# Patient Record
Sex: FEMALE | Race: WHITE | NOT HISPANIC OR LATINO | URBAN - METROPOLITAN AREA
[De-identification: names, ages, dates, MRNs, and addresses within clinical notes are randomized per-mention and may not be internally consistent; named-entity substitution may affect disease eponyms.]

---

## 2018-07-29 ENCOUNTER — EMERGENCY (EMERGENCY)
Facility: HOSPITAL | Age: 33
LOS: 1 days | Discharge: HOME | End: 2018-07-29
Attending: EMERGENCY MEDICINE | Admitting: EMERGENCY MEDICINE
Payer: MEDICAID

## 2018-07-29 VITALS
HEART RATE: 67 BPM | RESPIRATION RATE: 16 BRPM | DIASTOLIC BLOOD PRESSURE: 66 MMHG | OXYGEN SATURATION: 99 % | SYSTOLIC BLOOD PRESSURE: 100 MMHG

## 2018-07-29 VITALS
DIASTOLIC BLOOD PRESSURE: 68 MMHG | RESPIRATION RATE: 14 BRPM | SYSTOLIC BLOOD PRESSURE: 110 MMHG | TEMPERATURE: 98 F | HEART RATE: 55 BPM | OXYGEN SATURATION: 100 %

## 2018-07-29 DIAGNOSIS — F43.21 ADJUSTMENT DISORDER WITH DEPRESSED MOOD: ICD-10-CM

## 2018-07-29 DIAGNOSIS — F50.9 EATING DISORDER, UNSPECIFIED: ICD-10-CM

## 2018-07-29 LAB
ALBUMIN SERPL ELPH-MCNC: 4.3 G/DL — SIGNIFICANT CHANGE UP (ref 3.3–5)
ALP SERPL-CCNC: 67 U/L — SIGNIFICANT CHANGE UP (ref 40–120)
ALT FLD-CCNC: 10 U/L — SIGNIFICANT CHANGE UP (ref 4–33)
APAP SERPL-MCNC: < 15 UG/ML — LOW (ref 15–25)
AST SERPL-CCNC: 16 U/L — SIGNIFICANT CHANGE UP (ref 4–32)
BASOPHILS # BLD AUTO: 0.05 K/UL — SIGNIFICANT CHANGE UP (ref 0–0.2)
BASOPHILS NFR BLD AUTO: 0.6 % — SIGNIFICANT CHANGE UP (ref 0–2)
BILIRUB SERPL-MCNC: 0.3 MG/DL — SIGNIFICANT CHANGE UP (ref 0.2–1.2)
BUN SERPL-MCNC: 8 MG/DL — SIGNIFICANT CHANGE UP (ref 7–23)
CALCIUM SERPL-MCNC: 9.2 MG/DL — SIGNIFICANT CHANGE UP (ref 8.4–10.5)
CHLORIDE SERPL-SCNC: 105 MMOL/L — SIGNIFICANT CHANGE UP (ref 98–107)
CO2 SERPL-SCNC: 22 MMOL/L — SIGNIFICANT CHANGE UP (ref 22–31)
CREAT SERPL-MCNC: 0.84 MG/DL — SIGNIFICANT CHANGE UP (ref 0.5–1.3)
EOSINOPHIL # BLD AUTO: 0.27 K/UL — SIGNIFICANT CHANGE UP (ref 0–0.5)
EOSINOPHIL NFR BLD AUTO: 3.5 % — SIGNIFICANT CHANGE UP (ref 0–6)
ETHANOL BLD-MCNC: < 10 MG/DL — SIGNIFICANT CHANGE UP
GLUCOSE SERPL-MCNC: 80 MG/DL — SIGNIFICANT CHANGE UP (ref 70–99)
HCG SERPL-ACNC: < 5 MIU/ML — SIGNIFICANT CHANGE UP
HCT VFR BLD CALC: 35.9 % — SIGNIFICANT CHANGE UP (ref 34.5–45)
HGB BLD-MCNC: 12.3 G/DL — SIGNIFICANT CHANGE UP (ref 11.5–15.5)
IMM GRANULOCYTES # BLD AUTO: 0.02 # — SIGNIFICANT CHANGE UP
IMM GRANULOCYTES NFR BLD AUTO: 0.3 % — SIGNIFICANT CHANGE UP (ref 0–1.5)
LYMPHOCYTES # BLD AUTO: 2.25 K/UL — SIGNIFICANT CHANGE UP (ref 1–3.3)
LYMPHOCYTES # BLD AUTO: 29.1 % — SIGNIFICANT CHANGE UP (ref 13–44)
MCHC RBC-ENTMCNC: 29.9 PG — SIGNIFICANT CHANGE UP (ref 27–34)
MCHC RBC-ENTMCNC: 34.3 % — SIGNIFICANT CHANGE UP (ref 32–36)
MCV RBC AUTO: 87.1 FL — SIGNIFICANT CHANGE UP (ref 80–100)
MONOCYTES # BLD AUTO: 0.49 K/UL — SIGNIFICANT CHANGE UP (ref 0–0.9)
MONOCYTES NFR BLD AUTO: 6.3 % — SIGNIFICANT CHANGE UP (ref 2–14)
NEUTROPHILS # BLD AUTO: 4.65 K/UL — SIGNIFICANT CHANGE UP (ref 1.8–7.4)
NEUTROPHILS NFR BLD AUTO: 60.2 % — SIGNIFICANT CHANGE UP (ref 43–77)
NRBC # FLD: 0 — SIGNIFICANT CHANGE UP
PLATELET # BLD AUTO: 208 K/UL — SIGNIFICANT CHANGE UP (ref 150–400)
PMV BLD: 11.4 FL — SIGNIFICANT CHANGE UP (ref 7–13)
POTASSIUM SERPL-MCNC: 3.9 MMOL/L — SIGNIFICANT CHANGE UP (ref 3.5–5.3)
POTASSIUM SERPL-SCNC: 3.9 MMOL/L — SIGNIFICANT CHANGE UP (ref 3.5–5.3)
PROT SERPL-MCNC: 6.5 G/DL — SIGNIFICANT CHANGE UP (ref 6–8.3)
RBC # BLD: 4.12 M/UL — SIGNIFICANT CHANGE UP (ref 3.8–5.2)
RBC # FLD: 12.7 % — SIGNIFICANT CHANGE UP (ref 10.3–14.5)
SALICYLATES SERPL-MCNC: < 5 MG/DL — LOW (ref 15–30)
SODIUM SERPL-SCNC: 141 MMOL/L — SIGNIFICANT CHANGE UP (ref 135–145)
TSH SERPL-MCNC: 1.73 UIU/ML — SIGNIFICANT CHANGE UP (ref 0.27–4.2)
WBC # BLD: 7.73 K/UL — SIGNIFICANT CHANGE UP (ref 3.8–10.5)
WBC # FLD AUTO: 7.73 K/UL — SIGNIFICANT CHANGE UP (ref 3.8–10.5)

## 2018-07-29 PROCEDURE — 90792 PSYCH DIAG EVAL W/MED SRVCS: CPT

## 2018-07-29 PROCEDURE — 99053 MED SERV 10PM-8AM 24 HR FAC: CPT

## 2018-07-29 PROCEDURE — 70450 CT HEAD/BRAIN W/O DYE: CPT | Mod: 26

## 2018-07-29 PROCEDURE — 99285 EMERGENCY DEPT VISIT HI MDM: CPT | Mod: 25

## 2018-07-29 NOTE — ED PROVIDER NOTE - PROGRESS NOTE DETAILS
[place]: Medically cleared, rediscussed w/ BH. [place]: Still sad but much improved and interactive. Denies any medical complaints. Evaluated by  and given info on outpt f/u. Comfortable for dc, outpt pmd/psych f/u. given copy of results. [place]: Still sad but much improved and interactive. Denies any medical complaints. Evaluated by  and given info on outpt f/u. Comfortable for dc, outpt pmd/psych f/u. given copy of results. steady unassisted gait.
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
## 2018-07-29 NOTE — ED BEHAVIORAL HEALTH ASSESSMENT NOTE - SAFETY PLAN DETAILS
patient will go back to her family friends; they are supportive of her; patient will return to ER if her symptoms worsen. She agreed

## 2018-07-29 NOTE — ED BEHAVIORAL HEALTH ASSESSMENT NOTE - HYGIENE
[address]  [address] [facility] [address]  [phone]  [phone]               Thank you for choosing us for your health care visit with Savana Del Angel PT.   We are glad to serve you and happy to provide you with this [facility] ([address])    [address] [facility] [phone]           Please arrive at your scheduled appointment time. Wear comfortable, loose fitting clothing.             May 18, 2017  6:15 PM Good

## 2018-07-29 NOTE — ED PROVIDER NOTE - PHYSICAL EXAMINATION
no LE edema, normal equal distal pulses.  No clonus, rigidity, tremors, fasciculations. PERRL, EOMI, no nystagmus. Strength 5/5.  occasionally crying, not talking
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
## 2018-07-29 NOTE — ED BEHAVIORAL HEALTH ASSESSMENT NOTE - SUICIDE PROTECTIVE FACTORS
Future oriented/Engaged in work or school/Supportive social network or family/High spirituality/High frustration tolerance/Responsibility to family and others
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
## 2018-07-29 NOTE — ED ADULT TRIAGE NOTE - CHIEF COMPLAINT QUOTE
Pt from home, friend called EMS for anxiety. As per friend, pt left her  last week. Pt refusing to answer questions on assessment. Friend states pt has never expressed SI/HI and states there is no drug or alcohol tonight. No prior psychiatric history. No PMH. Awaiting Dr. Gorge erazo. Pt from home, friend called EMS for anxiety. As per friend, pt left her  last week. Pt refusing to answer questions on assessment. Friend states pt has never expressed SI/HI and states there is no drug or alcohol tonight. No prior psychiatric history. No PMH. Awaiting Dr. Gorge erazo.    Addendum 0030 - Pt evaluated by Dr. Pennington. Pt to be seen in the main.

## 2018-07-29 NOTE — ED BEHAVIORAL HEALTH ASSESSMENT NOTE - SUMMARY
34 yo female with no prior psych admissions, no Hx of SA; currently depressed secondary to marital problems ; presents with depressed mood, some anxiety; but sleeping well, has good energy level ; she describes her appetite as "a little bit low" , no weight loss; she denies feeling hopeless although sometimes she feels helpless, no excessive guilt, able to concentrate, no suicidality. She denies any S/H I/I/P . Patient denies any acute psychotic symptoms, denies hearing voices, no visions or delusions She denies any manic symptoms No diminished need for sleep/ goal directed activity, No racing thoughts, pressured speech, no spending sprees, No S/H I/I/P  Patient states that she spoke to her family in [place] and they are very supportive of her

## 2018-07-29 NOTE — ED BEHAVIORAL HEALTH ASSESSMENT NOTE - DESCRIPTION
cooperative with the interview Vital Signs Last 24 Hrs  T(C): 36.4 (29 Jul 2018 00:07), Max: 36.4 (29 Jul 2018 00:07)  T(F): 97.6 (29 Jul 2018 00:07), Max: 97.6 (29 Jul 2018 00:07)  HR: 67 (29 Jul 2018 04:39) (55 - 67)  BP: 100/66 (29 Jul 2018 04:39) (100/66 - 110/68)  BP(mean): --  RR: 16 (29 Jul 2018 04:39) (14 - 16)  SpO2: 99% (29 Jul 2018 04:39) (99% - 100%) denies , childless, HS, college, has a day care center in [place] Born and raised in [place] has 2 brothers one in [place] another one in [place]

## 2018-08-05 NOTE — ED BEHAVIORAL HEALTH NOTE - BEHAVIORAL HEALTH NOTE
Writer called the contact number listed in the urgent referral binder to assist the patient to access OP psychiatric treatment, but the number generates a voicemail message for an individual by the name of Geraldine, not the patient. There is no indication that messages may be left on this voicemail in the EMR or the urgent referral binder. Writer called the number listed in the EMR for the patient, [phone], and left a generic message, requesting a return call to the social work line, [phone].

## 2018-08-08 NOTE — ED BEHAVIORAL HEALTH NOTE - BEHAVIORAL HEALTH NOTE
called the number provided for contact regarding OP treatment, and advised patient's friend, who answered the call, that the patient could return the call if she wishes assistance obtaining OP treatment. The friend stated the patient has been out of state for several days.  informed her it is too late to make an appointment for the patient, but that staff can assist the patient to access treatment on her own, if she calls.  provided the SW line for callback.

## 2018-08-08 NOTE — ED BEHAVIORAL HEALTH NOTE - BEHAVIORAL HEALTH NOTE
Made an urgent referral outreach to patient [phone] indicating if call is not returned by end of business day today 8/8/18 urgent referral case will be closed.

## 2024-08-29 NOTE — ED BEHAVIORAL HEALTH ASSESSMENT NOTE - RISK ASSESSMENT
denies
risk factors: depressed mod and some anxiety  protective  factors: no hx of SA, no active or passive S I/I/P; engaged in work; supportive family; good insight/judgment